# Patient Record
Sex: FEMALE | Race: BLACK OR AFRICAN AMERICAN | NOT HISPANIC OR LATINO | URBAN - NONMETROPOLITAN AREA
[De-identification: names, ages, dates, MRNs, and addresses within clinical notes are randomized per-mention and may not be internally consistent; named-entity substitution may affect disease eponyms.]

---

## 2020-01-23 ENCOUNTER — IMPORTED ENCOUNTER (OUTPATIENT)
Dept: URBAN - NONMETROPOLITAN AREA CLINIC 1 | Facility: CLINIC | Age: 65
End: 2020-01-23

## 2020-01-23 PROBLEM — H52.03: Noted: 2020-01-23

## 2020-01-23 PROBLEM — Z96.1: Noted: 2020-01-23

## 2020-01-23 PROBLEM — H52.223: Noted: 2020-01-23

## 2020-01-23 PROBLEM — E11.9: Noted: 2020-01-23

## 2020-01-23 PROBLEM — H52.4: Noted: 2020-01-23

## 2020-01-23 PROCEDURE — S0620 ROUTINE OPHTHALMOLOGICAL EXA: HCPCS

## 2020-01-23 NOTE — PATIENT DISCUSSION
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

## 2022-01-24 ENCOUNTER — PREPPED CHART (OUTPATIENT)
Dept: URBAN - NONMETROPOLITAN AREA CLINIC 4 | Facility: CLINIC | Age: 67
End: 2022-01-24

## 2022-01-24 ENCOUNTER — COMPREHENSIVE EXAM (OUTPATIENT)
Dept: URBAN - NONMETROPOLITAN AREA CLINIC 4 | Facility: CLINIC | Age: 67
End: 2022-01-24

## 2022-01-24 DIAGNOSIS — E11.9: ICD-10-CM

## 2022-01-24 DIAGNOSIS — H52.03: ICD-10-CM

## 2022-01-24 PROCEDURE — 92014 COMPRE OPH EXAM EST PT 1/>: CPT

## 2022-01-24 PROCEDURE — 92015 DETERMINE REFRACTIVE STATE: CPT

## 2022-01-24 ASSESSMENT — VISUAL ACUITY
OD_CC: 20/25+1
OS_CC: 20/40-2
OS_PH: 20/30
OS_CC: 20/25
OU_CC: 20/25
OU_CC: 20/20
OU_CC: 20/20-1
OD_PH: 20/25-2
OD_CC: 20/30-1

## 2022-01-24 ASSESSMENT — TONOMETRY
OD_IOP_MMHG: 14
OD_IOP_MMHG: 16
OS_IOP_MMHG: 16
OS_IOP_MMHG: 14

## 2022-04-09 ASSESSMENT — VISUAL ACUITY
OD_SC: 20/30-
OD_PH: 20/25-2
OS_SC: 20/40-2
OS_PH: 20/30
OD_GLARE: 20/40
OU_CC: 20/25

## 2022-04-09 ASSESSMENT — TONOMETRY
OD_IOP_MMHG: 14
OS_IOP_MMHG: 14

## 2023-04-20 ENCOUNTER — COMPREHENSIVE EXAM (OUTPATIENT)
Dept: RURAL CLINIC 1 | Facility: CLINIC | Age: 68
End: 2023-04-20

## 2023-04-20 DIAGNOSIS — Z96.1: ICD-10-CM

## 2023-04-20 DIAGNOSIS — E11.9: ICD-10-CM

## 2023-04-20 PROCEDURE — 92014 COMPRE OPH EXAM EST PT 1/>: CPT

## 2023-04-20 ASSESSMENT — VISUAL ACUITY
OU_CC: 20/20-2
OS_CC: 20/30
OD_CC: 20/30-1
OU_CC: 20/20
OS_CC: 20/20
OD_CC: 20/20

## 2023-04-20 ASSESSMENT — TONOMETRY
OS_IOP_MMHG: 14
OD_IOP_MMHG: 14

## 2024-09-18 ENCOUNTER — COMPREHENSIVE EXAM (OUTPATIENT)
Dept: URBAN - NONMETROPOLITAN AREA CLINIC 4 | Facility: CLINIC | Age: 69
End: 2024-09-18

## 2024-09-18 DIAGNOSIS — H50.22: ICD-10-CM

## 2024-09-18 DIAGNOSIS — E11.9: ICD-10-CM

## 2024-09-18 DIAGNOSIS — Z79.4: ICD-10-CM

## 2024-09-18 DIAGNOSIS — H50.21: ICD-10-CM

## 2024-09-18 DIAGNOSIS — H52.223: ICD-10-CM

## 2024-09-18 DIAGNOSIS — Z96.1: ICD-10-CM

## 2024-09-18 PROCEDURE — 92015 DETERMINE REFRACTIVE STATE: CPT

## 2024-09-18 PROCEDURE — 92014 COMPRE OPH EXAM EST PT 1/>: CPT

## 2024-10-10 NOTE — PATIENT DISCUSSION
HPI    Vision changes since last eye exam?: Pt states her far vision has become   very blurry in her right over the last month. Pt notices the blurriness   when she is driving and looking at other cars per pt. Pt is not currently   using any eye drops and correction.     Any eye pain today: Pt denies any eye pain.    Other ocular symptoms: None noticed by pt.    Interested in contact lens fitting today? No.     Last edited by Aline Stock on 10/10/2024 12:56 PM.            Assessment /Plan     For exam results, see Encounter Report.    Blurred vision, bilateral    Myopia, bilateral    Regular astigmatism of left eye      Eyeglass Final Rx       Eyeglass Final Rx         Sphere Cylinder Axis    Right -2.00      Left -0.50 +0.50 080      Expiration Date: 10/10/2025   PD 59                   RTC 1 yr for dilated eye exam or PRN if any problems.   Discussed above and answered questions.                      Observe.